# Patient Record
Sex: MALE | Race: WHITE | NOT HISPANIC OR LATINO | Employment: OTHER | URBAN - NONMETROPOLITAN AREA
[De-identification: names, ages, dates, MRNs, and addresses within clinical notes are randomized per-mention and may not be internally consistent; named-entity substitution may affect disease eponyms.]

---

## 2020-03-24 ENCOUNTER — TRANSCRIBE ORDERS (OUTPATIENT)
Dept: ADMINISTRATIVE | Facility: HOSPITAL | Age: 75
End: 2020-03-24

## 2020-03-24 DIAGNOSIS — R97.20 INCREASED PROSTATE SPECIFIC ANTIGEN (PSA) VELOCITY: Primary | ICD-10-CM

## 2020-05-04 ENCOUNTER — APPOINTMENT (OUTPATIENT)
Dept: MRI IMAGING | Facility: HOSPITAL | Age: 75
End: 2020-05-04

## 2022-12-14 ENCOUNTER — HOSPITAL ENCOUNTER (OUTPATIENT)
Facility: HOSPITAL | Age: 77
Setting detail: OBSERVATION
Discharge: HOME | End: 2022-12-15
Attending: FAMILY MEDICINE | Admitting: INTERNAL MEDICINE

## 2022-12-14 ENCOUNTER — APPOINTMENT (OUTPATIENT)
Dept: GENERAL RADIOLOGY | Facility: HOSPITAL | Age: 77
End: 2022-12-14

## 2022-12-14 DIAGNOSIS — R77.8 ELEVATED TROPONIN: ICD-10-CM

## 2022-12-14 DIAGNOSIS — I48.91 NEW ONSET ATRIAL FIBRILLATION: ICD-10-CM

## 2022-12-14 DIAGNOSIS — R07.9 CHEST PAIN, UNSPECIFIED TYPE: Primary | ICD-10-CM

## 2022-12-14 PROBLEM — I25.10 CAD (CORONARY ARTERY DISEASE): Status: ACTIVE | Noted: 2022-12-14

## 2022-12-14 PROBLEM — I73.9 PAD (PERIPHERAL ARTERY DISEASE): Status: ACTIVE | Noted: 2022-12-14

## 2022-12-14 PROBLEM — Z95.1 HX OF CABG: Status: ACTIVE | Noted: 2022-12-14

## 2022-12-14 PROBLEM — I10 ESSENTIAL HYPERTENSION: Status: ACTIVE | Noted: 2022-12-14

## 2022-12-14 PROBLEM — K21.9 GERD (GASTROESOPHAGEAL REFLUX DISEASE): Status: ACTIVE | Noted: 2022-12-14

## 2022-12-14 PROBLEM — I48.0 PAROXYSMAL ATRIAL FIBRILLATION: Status: ACTIVE | Noted: 2022-12-14

## 2022-12-14 LAB
ALBUMIN SERPL-MCNC: 3.6 G/DL (ref 3.5–5.2)
ALBUMIN/GLOB SERPL: 1.2 G/DL
ALP SERPL-CCNC: 53 U/L (ref 39–117)
ALT SERPL W P-5'-P-CCNC: 17 U/L (ref 1–41)
ANION GAP SERPL CALCULATED.3IONS-SCNC: 11 MMOL/L (ref 5–15)
ANION GAP SERPL CALCULATED.3IONS-SCNC: 8 MMOL/L (ref 5–15)
APTT PPP: 36.1 SECONDS (ref 24.1–35)
AST SERPL-CCNC: 17 U/L (ref 1–40)
BASOPHILS # BLD AUTO: 0.04 10*3/MM3 (ref 0–0.2)
BASOPHILS NFR BLD AUTO: 0.5 % (ref 0–1.5)
BILIRUB SERPL-MCNC: 0.7 MG/DL (ref 0–1.2)
BUN SERPL-MCNC: 23 MG/DL (ref 8–23)
BUN SERPL-MCNC: 27 MG/DL (ref 8–23)
BUN/CREAT SERPL: 20.4 (ref 7–25)
BUN/CREAT SERPL: 23.5 (ref 7–25)
CALCIUM SPEC-SCNC: 8.8 MG/DL (ref 8.6–10.5)
CALCIUM SPEC-SCNC: 9.3 MG/DL (ref 8.6–10.5)
CHLORIDE SERPL-SCNC: 104 MMOL/L (ref 98–107)
CHLORIDE SERPL-SCNC: 104 MMOL/L (ref 98–107)
CK SERPL-CCNC: 137 U/L (ref 20–200)
CO2 SERPL-SCNC: 24 MMOL/L (ref 22–29)
CO2 SERPL-SCNC: 28 MMOL/L (ref 22–29)
CREAT SERPL-MCNC: 1.13 MG/DL (ref 0.76–1.27)
CREAT SERPL-MCNC: 1.15 MG/DL (ref 0.76–1.27)
D DIMER PPP FEU-MCNC: 0.74 MCGFEU/ML (ref 0–0.79)
DEPRECATED RDW RBC AUTO: 47.8 FL (ref 37–54)
EGFRCR SERPLBLD CKD-EPI 2021: 64.7 ML/MIN/1.73
EGFRCR SERPLBLD CKD-EPI 2021: 66.1 ML/MIN/1.73
EOSINOPHIL # BLD AUTO: 0.14 10*3/MM3 (ref 0–0.4)
EOSINOPHIL NFR BLD AUTO: 1.8 % (ref 0.3–6.2)
ERYTHROCYTE [DISTWIDTH] IN BLOOD BY AUTOMATED COUNT: 13.1 % (ref 12.3–15.4)
GLOBULIN UR ELPH-MCNC: 3 GM/DL
GLUCOSE SERPL-MCNC: 107 MG/DL (ref 65–99)
GLUCOSE SERPL-MCNC: 98 MG/DL (ref 65–99)
HCT VFR BLD AUTO: 44.7 % (ref 37.5–51)
HGB BLD-MCNC: 14.1 G/DL (ref 13–17.7)
HOLD SPECIMEN: NORMAL
HOLD SPECIMEN: NORMAL
IMM GRANULOCYTES # BLD AUTO: 0.03 10*3/MM3 (ref 0–0.05)
IMM GRANULOCYTES NFR BLD AUTO: 0.4 % (ref 0–0.5)
INR PPP: 0.96 (ref 0.91–1.09)
LYMPHOCYTES # BLD AUTO: 1.56 10*3/MM3 (ref 0.7–3.1)
LYMPHOCYTES NFR BLD AUTO: 20 % (ref 19.6–45.3)
MAGNESIUM SERPL-MCNC: 1.8 MG/DL (ref 1.6–2.4)
MAGNESIUM SERPL-MCNC: 1.9 MG/DL (ref 1.6–2.4)
MCH RBC QN AUTO: 31.5 PG (ref 26.6–33)
MCHC RBC AUTO-ENTMCNC: 31.5 G/DL (ref 31.5–35.7)
MCV RBC AUTO: 100 FL (ref 79–97)
MONOCYTES # BLD AUTO: 0.73 10*3/MM3 (ref 0.1–0.9)
MONOCYTES NFR BLD AUTO: 9.3 % (ref 5–12)
NEUTROPHILS NFR BLD AUTO: 5.31 10*3/MM3 (ref 1.7–7)
NEUTROPHILS NFR BLD AUTO: 68 % (ref 42.7–76)
NRBC BLD AUTO-RTO: 0 /100 WBC (ref 0–0.2)
NT-PROBNP SERPL-MCNC: 219.8 PG/ML (ref 0–1800)
PLATELET # BLD AUTO: 181 10*3/MM3 (ref 140–450)
PMV BLD AUTO: 10.2 FL (ref 6–12)
POTASSIUM SERPL-SCNC: 3.9 MMOL/L (ref 3.5–5.2)
POTASSIUM SERPL-SCNC: 4.2 MMOL/L (ref 3.5–5.2)
PROT SERPL-MCNC: 6.6 G/DL (ref 6–8.5)
PROTHROMBIN TIME: 12.9 SECONDS (ref 11.8–14.8)
RBC # BLD AUTO: 4.47 10*6/MM3 (ref 4.14–5.8)
SODIUM SERPL-SCNC: 139 MMOL/L (ref 136–145)
SODIUM SERPL-SCNC: 140 MMOL/L (ref 136–145)
TROPONIN T SERPL-MCNC: 0.04 NG/ML (ref 0–0.03)
TROPONIN T SERPL-MCNC: 0.04 NG/ML (ref 0–0.03)
WBC NRBC COR # BLD: 7.81 10*3/MM3 (ref 3.4–10.8)
WHOLE BLOOD HOLD COAG: NORMAL
WHOLE BLOOD HOLD SPECIMEN: NORMAL

## 2022-12-14 PROCEDURE — G0378 HOSPITAL OBSERVATION PER HR: HCPCS

## 2022-12-14 PROCEDURE — 85730 THROMBOPLASTIN TIME PARTIAL: CPT | Performed by: FAMILY MEDICINE

## 2022-12-14 PROCEDURE — 93005 ELECTROCARDIOGRAM TRACING: CPT | Performed by: INTERNAL MEDICINE

## 2022-12-14 PROCEDURE — 84484 ASSAY OF TROPONIN QUANT: CPT | Performed by: INTERNAL MEDICINE

## 2022-12-14 PROCEDURE — 71045 X-RAY EXAM CHEST 1 VIEW: CPT

## 2022-12-14 PROCEDURE — 80053 COMPREHEN METABOLIC PANEL: CPT | Performed by: FAMILY MEDICINE

## 2022-12-14 PROCEDURE — 93010 ELECTROCARDIOGRAM REPORT: CPT | Performed by: INTERNAL MEDICINE

## 2022-12-14 PROCEDURE — 25010000002 ENOXAPARIN PER 10 MG: Performed by: INTERNAL MEDICINE

## 2022-12-14 PROCEDURE — 36415 COLL VENOUS BLD VENIPUNCTURE: CPT | Performed by: INTERNAL MEDICINE

## 2022-12-14 PROCEDURE — 83735 ASSAY OF MAGNESIUM: CPT | Performed by: FAMILY MEDICINE

## 2022-12-14 PROCEDURE — 83735 ASSAY OF MAGNESIUM: CPT | Performed by: INTERNAL MEDICINE

## 2022-12-14 PROCEDURE — 96374 THER/PROPH/DIAG INJ IV PUSH: CPT

## 2022-12-14 PROCEDURE — 84484 ASSAY OF TROPONIN QUANT: CPT | Performed by: FAMILY MEDICINE

## 2022-12-14 PROCEDURE — 93005 ELECTROCARDIOGRAM TRACING: CPT | Performed by: FAMILY MEDICINE

## 2022-12-14 PROCEDURE — 99204 OFFICE O/P NEW MOD 45 MIN: CPT | Performed by: INTERNAL MEDICINE

## 2022-12-14 PROCEDURE — 82550 ASSAY OF CK (CPK): CPT | Performed by: FAMILY MEDICINE

## 2022-12-14 PROCEDURE — 85379 FIBRIN DEGRADATION QUANT: CPT | Performed by: FAMILY MEDICINE

## 2022-12-14 PROCEDURE — 93005 ELECTROCARDIOGRAM TRACING: CPT

## 2022-12-14 PROCEDURE — 96372 THER/PROPH/DIAG INJ SC/IM: CPT

## 2022-12-14 PROCEDURE — 99285 EMERGENCY DEPT VISIT HI MDM: CPT

## 2022-12-14 PROCEDURE — 85025 COMPLETE CBC W/AUTO DIFF WBC: CPT | Performed by: FAMILY MEDICINE

## 2022-12-14 PROCEDURE — 83880 ASSAY OF NATRIURETIC PEPTIDE: CPT | Performed by: FAMILY MEDICINE

## 2022-12-14 PROCEDURE — 85610 PROTHROMBIN TIME: CPT | Performed by: FAMILY MEDICINE

## 2022-12-14 RX ORDER — SODIUM CHLORIDE 0.9 % (FLUSH) 0.9 %
10 SYRINGE (ML) INJECTION AS NEEDED
Status: DISCONTINUED | OUTPATIENT
Start: 2022-12-14 | End: 2022-12-15 | Stop reason: HOSPADM

## 2022-12-14 RX ORDER — LABETALOL HYDROCHLORIDE 5 MG/ML
10 INJECTION, SOLUTION INTRAVENOUS EVERY 6 HOURS PRN
Status: DISCONTINUED | OUTPATIENT
Start: 2022-12-14 | End: 2022-12-15 | Stop reason: HOSPADM

## 2022-12-14 RX ORDER — FAMOTIDINE 10 MG/ML
20 INJECTION, SOLUTION INTRAVENOUS EVERY 12 HOURS SCHEDULED
Status: DISCONTINUED | OUTPATIENT
Start: 2022-12-14 | End: 2022-12-15 | Stop reason: HOSPADM

## 2022-12-14 RX ORDER — TAMSULOSIN HYDROCHLORIDE 0.4 MG/1
0.4 CAPSULE ORAL DAILY
COMMUNITY
Start: 2022-06-27

## 2022-12-14 RX ORDER — MELATONIN
1000 DAILY
COMMUNITY

## 2022-12-14 RX ORDER — HYDRALAZINE HYDROCHLORIDE 20 MG/ML
20 INJECTION INTRAMUSCULAR; INTRAVENOUS ONCE
Status: DISCONTINUED | OUTPATIENT
Start: 2022-12-14 | End: 2022-12-14

## 2022-12-14 RX ORDER — CLOPIDOGREL BISULFATE 75 MG/1
75 TABLET ORAL DAILY
Status: DISCONTINUED | OUTPATIENT
Start: 2022-12-15 | End: 2022-12-15

## 2022-12-14 RX ORDER — ACETAMINOPHEN 325 MG/1
650 TABLET ORAL EVERY 6 HOURS PRN
Status: DISCONTINUED | OUTPATIENT
Start: 2022-12-14 | End: 2022-12-15 | Stop reason: HOSPADM

## 2022-12-14 RX ORDER — SODIUM CHLORIDE 0.9 % (FLUSH) 0.9 %
10 SYRINGE (ML) INJECTION EVERY 12 HOURS SCHEDULED
Status: DISCONTINUED | OUTPATIENT
Start: 2022-12-14 | End: 2022-12-15 | Stop reason: HOSPADM

## 2022-12-14 RX ORDER — BUDESONIDE AND FORMOTEROL FUMARATE DIHYDRATE 80; 4.5 UG/1; UG/1
2 AEROSOL RESPIRATORY (INHALATION)
Status: DISCONTINUED | OUTPATIENT
Start: 2022-12-14 | End: 2022-12-14

## 2022-12-14 RX ORDER — BUDESONIDE, GLYCOPYRROLATE, AND FORMOTEROL FUMARATE 160; 9; 4.8 UG/1; UG/1; UG/1
2 AEROSOL, METERED RESPIRATORY (INHALATION) 2 TIMES DAILY
COMMUNITY
Start: 2022-09-28

## 2022-12-14 RX ORDER — SODIUM CHLORIDE 9 MG/ML
40 INJECTION, SOLUTION INTRAVENOUS AS NEEDED
Status: DISCONTINUED | OUTPATIENT
Start: 2022-12-14 | End: 2022-12-15 | Stop reason: HOSPADM

## 2022-12-14 RX ORDER — ASPIRIN 81 MG/1
81 TABLET ORAL DAILY
Status: DISCONTINUED | OUTPATIENT
Start: 2022-12-15 | End: 2022-12-15 | Stop reason: HOSPADM

## 2022-12-14 RX ORDER — ONDANSETRON 2 MG/ML
4 INJECTION INTRAMUSCULAR; INTRAVENOUS EVERY 6 HOURS PRN
Status: DISCONTINUED | OUTPATIENT
Start: 2022-12-14 | End: 2022-12-15 | Stop reason: HOSPADM

## 2022-12-14 RX ORDER — ASPIRIN 81 MG/1
81 TABLET ORAL DAILY
COMMUNITY

## 2022-12-14 RX ORDER — RAMIPRIL 10 MG/1
10 CAPSULE ORAL DAILY
COMMUNITY
Start: 2022-11-14

## 2022-12-14 RX ORDER — LISINOPRIL 10 MG/1
10 TABLET ORAL
Status: DISCONTINUED | OUTPATIENT
Start: 2022-12-14 | End: 2022-12-15 | Stop reason: HOSPADM

## 2022-12-14 RX ORDER — RAMIPRIL 5 MG/1
5 CAPSULE ORAL DAILY
COMMUNITY
Start: 2022-06-27

## 2022-12-14 RX ORDER — CLOPIDOGREL BISULFATE 75 MG/1
75 TABLET ORAL DAILY
COMMUNITY
End: 2022-12-15 | Stop reason: HOSPADM

## 2022-12-14 RX ORDER — NITROGLYCERIN 0.4 MG/1
0.4 TABLET SUBLINGUAL
Status: DISCONTINUED | OUTPATIENT
Start: 2022-12-14 | End: 2022-12-15 | Stop reason: HOSPADM

## 2022-12-14 RX ORDER — TAMSULOSIN HYDROCHLORIDE 0.4 MG/1
0.4 CAPSULE ORAL DAILY
Status: DISCONTINUED | OUTPATIENT
Start: 2022-12-15 | End: 2022-12-15 | Stop reason: HOSPADM

## 2022-12-14 RX ORDER — ALBUTEROL SULFATE 2.5 MG/3ML
2.5 SOLUTION RESPIRATORY (INHALATION) EVERY 6 HOURS PRN
Status: DISCONTINUED | OUTPATIENT
Start: 2022-12-14 | End: 2022-12-15 | Stop reason: HOSPADM

## 2022-12-14 RX ORDER — BUDESONIDE AND FORMOTEROL FUMARATE DIHYDRATE 160; 4.5 UG/1; UG/1
2 AEROSOL RESPIRATORY (INHALATION)
Status: DISCONTINUED | OUTPATIENT
Start: 2022-12-14 | End: 2022-12-15 | Stop reason: HOSPADM

## 2022-12-14 RX ORDER — ENOXAPARIN SODIUM 150 MG/ML
1 INJECTION SUBCUTANEOUS EVERY 12 HOURS
Status: DISCONTINUED | OUTPATIENT
Start: 2022-12-15 | End: 2022-12-15

## 2022-12-14 RX ORDER — NITROGLYCERIN 0.4 MG/1
0.4 TABLET SUBLINGUAL
COMMUNITY
Start: 2022-06-27

## 2022-12-14 RX ORDER — ATORVASTATIN CALCIUM 20 MG/1
20 TABLET, FILM COATED ORAL DAILY
COMMUNITY
Start: 2022-06-27

## 2022-12-14 RX ORDER — DIPHENOXYLATE HYDROCHLORIDE AND ATROPINE SULFATE 2.5; .025 MG/1; MG/1
1 TABLET ORAL DAILY
COMMUNITY

## 2022-12-14 RX ORDER — LORAZEPAM 1 MG/1
TABLET ORAL
COMMUNITY
Start: 2022-09-06

## 2022-12-14 RX ORDER — ENOXAPARIN SODIUM 150 MG/ML
1 INJECTION SUBCUTANEOUS ONCE
Status: COMPLETED | OUTPATIENT
Start: 2022-12-14 | End: 2022-12-14

## 2022-12-14 RX ORDER — ATORVASTATIN CALCIUM 10 MG/1
20 TABLET, FILM COATED ORAL NIGHTLY
Status: DISCONTINUED | OUTPATIENT
Start: 2022-12-14 | End: 2022-12-15 | Stop reason: HOSPADM

## 2022-12-14 RX ADMIN — LISINOPRIL 10 MG: 10 TABLET ORAL at 17:01

## 2022-12-14 RX ADMIN — Medication 10 ML: at 17:02

## 2022-12-14 RX ADMIN — METOPROLOL TARTRATE 25 MG: 25 TABLET, FILM COATED ORAL at 17:01

## 2022-12-14 RX ADMIN — FAMOTIDINE 20 MG: 10 INJECTION INTRAVENOUS at 17:01

## 2022-12-14 RX ADMIN — ENOXAPARIN SODIUM 140 MG: 150 INJECTION SUBCUTANEOUS at 17:01

## 2022-12-14 NOTE — H&P
[facility] Medicine Services  HISTORY AND PHYSICAL    Date of Admission: 12/14/2022  Primary Care Physician: Provider, No Known    Subjective   Primary Historian: Patient    Chief Complaint: chest pain, afib    History of Present Illness  Patient is a 77-year-old male who lives in [facility] where he follows for primary care and cardiology.  Patient has a prior history of CAD with PCI and subsequent CABG back in 2014.  Patient states last night he began having some chest discomfort and went to the ER for evaluation.  He reportedly was found to be in A. fib RVR.  He also had a elevated high-sensitivity troponin.  Due to troponin elevation he was transferred here for further care.  In transfer of care he went back into sinus rhythm.  Patient tells me he had A. fib once in the past around the time he had his CABG surgery.  Denies being on chronic blood thinners.  He is on aspirin and Plavix for his underlying heart disease.  He has not had any stent placement recently and states his CABG was his last cardiac intervention.  Currently he is on room air with sats in the mid 90s and feels better.  No current chest pain.  No nausea or vomiting.  Denies any recent fevers or chills.  He does admit to some intermittent bouts of diaphoresis and chest discomfort which been ongoing more frequently recently.  He tells me these are similar to prior events he had back around his last CABG.  No other new complaints.  First troponin here was still elevated at 0.045 we been asked admit for further care.      Review of Systems   Otherwise complete ROS reviewed and negative except as mentioned in the HPI.    Past Medical History:   CAD, hypertension, PAD, CKD 2, hyperlipidemia, COPD    Past Surgical History:  CABG 2014    Social History:   Hx of previous tobacco use, Denies etoh or drugs    Family History:   Hypertension    Allergies:  Allergies   Allergen Reactions   • Latex Rash   •  Pineapple Rash       Medications:  Prior to Admission medications    Medication Sig Start Date End Date Taking? Authorizing Provider   atorvastatin (LIPITOR) 20 MG tablet Take 20 mg by mouth Daily. 6/27/22  Yes Dony Godinez MD   Budeson-Glycopyrrol-Formoterol (Breztri Aerosphere) 160-9-4.8 MCG/ACT aerosol inhaler Inhale 2 puffs 2 (Two) Times a Day. 9/28/22  Yes Dony Godinez MD   LORazepam (ATIVAN) 1 MG tablet TAKE 1 TABLET(1 MG) BY MOUTH EVERY 8 HOURS AS NEEDED FOR ANXIETY 9/6/22  Yes Dony Godinez MD   nitroglycerin (NITROSTAT) 0.4 MG SL tablet Place 0.4 mg under the tongue Every 5 (Five) Minutes As Needed. 6/27/22  Yes Dony Godinez MD   ramipril (ALTACE) 10 MG capsule Take 10 mg by mouth Daily. 11/14/22  Yes oDny Godinez MD   ramipril (ALTACE) 5 MG capsule Take 5 mg by mouth Daily. 6/27/22  Yes Dony Godinez MD   tamsulosin (FLOMAX) 0.4 MG capsule 24 hr capsule Take 0.4 mg by mouth Daily. 6/27/22  Yes Dony Godinez MD   aspirin 81 MG EC tablet Take 81 mg by mouth Daily.    Dony Godinez MD   Budeson-Glycopyrrol-Formoterol (BREZTRI) 160-9-4.8 MCG/ACT aerosol inhaler Inhale.    Dony Godinez MD   Cholecalciferol 25 MCG (1000 UT) tablet Take 1,000 Units by mouth Daily.    Dony Godinez MD   clopidogrel (PLAVIX) 75 MG tablet Take 75 mg by mouth Daily.    Dony Godinez MD   cyanocobalamin (VITAMIN B-12) 1000 MCG tablet Take 1,000 mcg by mouth Daily.    Dony Godinez MD   Magnesium 400 MG capsule Take 400 mg by mouth Daily.    Dony Godinez MD   metoprolol tartrate (LOPRESSOR) 25 MG tablet Take 25 mg by mouth.    Dony Godinez MD   multivitamin (THERAGRAN) tablet tablet Take 1 tablet by mouth Daily.    Dony Godinez MD   Potassium 99 MG tablet Take 1 tablet by mouth Daily.    Dony Godinez MD   POTASSIUM GLUCONATE PO Take 1 tablet by mouth.    Dony Godinez MD     I have utilized  "all available immediate resources to obtain, update, or review the patient's current medications (including all prescriptions, over-the-counter products, herbals, cannabis/cannabidiol products, and vitamin/mineral/dietary (nutritional) supplements).    Objective     Vital Signs: BP (!) 190/97   Pulse 73   Temp 99.2 °F (37.3 °C)   Resp 20   Ht 182.9 cm (72\")   Wt (!) 139 kg (307 lb)   SpO2 98%   BMI 41.64 kg/m²   Physical Exam   GEN: Awake, alert, interactive, in NAD  HEENT:  PERRLA, EOMI, Anicteric, Trachea midline  Lungs: no wheezing/rales/rhonchi  Heart: RRR, +S1/s2, no rub  ABD: soft, nt/nd, +BS, no guarding/rebound  Extremities: atraumatic, no cyanosis, trace LE edema  Skin: no rashes or petechiae  Neuro: AAOx3, no focal deficits  Psych: normal mood & affect        Results Reviewed:  Lab Results (last 24 hours)     Procedure Component Value Units Date/Time    Troponin [[id number]]  (Abnormal) Collected: 12/14/22 1027    Specimen: Blood Updated: 12/14/22 1152     Troponin T 0.045 ng/mL     Narrative:      Troponin T Reference Range:  <= 0.03 ng/mL-   Negative for AMI  >0.03 ng/mL-     Abnormal for myocardial necrosis.  Clinicians would have to utilize clinical acumen, EKG, Troponin and serial changes to determine if it is an Acute Myocardial Infarction or myocardial injury due to an underlying chronic condition.       Results may be falsely decreased if patient taking Biotin.      [place] [facility] [[id number]] Collected: 12/14/22 1027    Specimen: Blood Updated: 12/14/22 1130    Narrative:      The following orders were created for panel order [place] [facility].  Procedure                               Abnormality         Status                     ---------                               -----------         ------                     Green Top (Gel)[[id number]]                                  Final result               Lavender Top[[id number]]                                     Final result               Red " Top[[id number]]                                          Final result               Light Blue Top[[id number]]                                   Final result                 Please view results for these tests on the individual orders.    Green Top (Gel) [[id number]] Collected: 12/14/22 1027    Specimen: Blood Updated: 12/14/22 1130     Extra Tube Hold for add-ons.     Comment: Auto resulted.       Red Top [[id number]] Collected: 12/14/22 1027    Specimen: Blood Updated: 12/14/22 1130     Extra Tube Hold for add-ons.     Comment: Auto resulted.       Light Blue Top [[id number]] Collected: 12/14/22 1027    Specimen: Blood Updated: 12/14/22 1130     Extra Tube Hold for add-ons.     Comment: Auto resulted       Lavender Top [[id number]] Collected: 12/14/22 1027    Specimen: Blood Updated: 12/14/22 1130     Extra Tube hold for add-on     Comment: Auto resulted       Comprehensive Metabolic Panel [[id number]]  (Abnormal) Collected: 12/14/22 1027    Specimen: Blood Updated: 12/14/22 1107     Glucose 107 mg/dL      BUN 27 mg/dL      Creatinine 1.15 mg/dL      Sodium 140 mmol/L      Potassium 4.2 mmol/L      Comment: Slight hemolysis detected by analyzer. Results may be affected.        Chloride 104 mmol/L      CO2 28.0 mmol/L      Calcium 9.3 mg/dL      Total Protein 6.6 g/dL      Albumin 3.60 g/dL      ALT (SGPT) 17 U/L      AST (SGOT) 17 U/L      Comment: Slight hemolysis detected by analyzer. Results may be affected.        Alkaline Phosphatase 53 U/L      Total Bilirubin 0.7 mg/dL      Globulin 3.0 gm/dL      A/G Ratio 1.2 g/dL      BUN/Creatinine Ratio 23.5     Anion Gap 8.0 mmol/L      eGFR 64.7 mL/min/1.73      Comment: National Kidney Foundation and American Society of Nephrology (ASN) Task Force recommended calculation based on the Chronic Kidney Disease Epidemiology Collaboration (CKD-EPI) equation refit without adjustment for race.       Narrative:      GFR Normal >60  Chronic Kidney Disease <60  Kidney Failure  "<15    The GFR formula is only valid for adults with stable renal function between ages 18 and 70.    CK [[id number]]  (Normal) Collected: 12/14/22 1027    Specimen: Blood Updated: 12/14/22 1106     Creatine Kinase 137 U/L     BNP [[id number]]  (Normal) Collected: 12/14/22 1027    Specimen: Blood Updated: 12/14/22 1104     proBNP 219.8 pg/mL     Narrative:      Among patients with dyspnea, NT-proBNP is highly sensitive for the detection of acute congestive heart failure. In addition NT-proBNP of <300 pg/ml effectively rules out acute congestive heart failure with 99% negative predictive value.      Magnesium [[id number]]  (Normal) Collected: 12/14/22 1027    Specimen: Blood Updated: 12/14/22 1101     Magnesium 1.9 mg/dL     aPTT [[id number]]  (Abnormal) Collected: 12/14/22 1027    Specimen: Blood Updated: 12/14/22 1100     PTT 36.1 seconds     D-dimer, Quantitative [[id number]]  (Normal) Collected: 12/14/22 1027    Specimen: Blood Updated: 12/14/22 1100     D-Dimer, Quantitative 0.74 MCGFEU/mL     Narrative:      According to the assay 's published package insert, a normal (<0.50 MCGFEU/mL) D-dimer result in conjunction with a non-high clinical probability assessment, excludes deep vein thrombosis (DVT) and pulmonary embolism (PE) with high sensitivity.    D-dimer values increase with age and this can make VTE exclusion of an older population difficult. To address this, the American College of Physicians, based on best available evidence and recent guidelines, recommends that clinicians use age-adjusted D-dimer thresholds in patients greater than 50 years of age with: a) a low probability of PE who do not meet all Pulmonary Embolism Rule Out Criteria, or b) in those with intermediate probability of PE.   The formula for an age-adjusted D-dimer cut-off is \"age/100\".  For example, a 60 year old patient would have an age-adjusted cut-off of 0.60 MCGFEU/mL and an 80 year old 0.80 MCGFEU/mL.    Protime-INR " [[id number]]  (Normal) Collected: 12/14/22 1027    Specimen: Blood Updated: 12/14/22 1100     Protime 12.9 Seconds      INR 0.96    CBC & Differential [[id number]]  (Abnormal) Collected: 12/14/22 1027    Specimen: Blood Updated: 12/14/22 1050    Narrative:      The following orders were created for panel order CBC & Differential.  Procedure                               Abnormality         Status                     ---------                               -----------         ------                     CBC Auto Differential[[id number]]        Abnormal            Final result                 Please view results for these tests on the individual orders.    CBC Auto Differential [[id number]]  (Abnormal) Collected: 12/14/22 1027    Specimen: Blood Updated: 12/14/22 1050     WBC 7.81 10*3/mm3      RBC 4.47 10*6/mm3      Hemoglobin 14.1 g/dL      Hematocrit 44.7 %      .0 fL      MCH 31.5 pg      MCHC 31.5 g/dL      RDW 13.1 %      RDW-SD 47.8 fl      MPV 10.2 fL      Platelets 181 10*3/mm3      Neutrophil % 68.0 %      Lymphocyte % 20.0 %      Monocyte % 9.3 %      Eosinophil % 1.8 %      Basophil % 0.5 %      Immature Grans % 0.4 %      Neutrophils, Absolute 5.31 10*3/mm3      Lymphocytes, Absolute 1.56 10*3/mm3      Monocytes, Absolute 0.73 10*3/mm3      Eosinophils, Absolute 0.14 10*3/mm3      Basophils, Absolute 0.04 10*3/mm3      Immature Grans, Absolute 0.03 10*3/mm3      nRBC 0.0 /100 WBC         Imaging Results (Last 24 Hours)     Procedure Component Value Units Date/Time    XR Chest 1 View [[id number]] Collected: 12/14/22 1044     Updated: 12/14/22 1048    Narrative:      EXAMINATION: XR CHEST 1 VW-     12/14/2022 10:31 AM CST     HISTORY: Chest pain     A frontal projection of the chest is obtained. There is no previous  study for comparison.     The lungs are poorly expanded.     There are coarse interstitial changes in the lower lungs bilaterally  representing scarring and atelectasis.     Few granulomas are  seen in the lungs bilaterally.     No evidence of pleural effusion, pulmonary congestion or pneumothorax.     The heart size is in the normal range. There is evidence of previous  cardiac surgery.     No acute bony abnormality.       Impression:      1. No active cardiopulmonary disease.        This report was finalized on 12/14/2022 10:45 by Dr. Leo Villar MD.        I have personally reviewed and interpreted the radiology studies and ECG obtained at time of admission.     Assessment / Plan     Assessment:   Active Hospital Problems    Diagnosis    • **Chest pain, unspecified type    • Paroxysmal atrial fibrillation (HCC)    • CAD (coronary artery disease)    • Hx of CABG    • Essential hypertension    • PAD (peripheral artery disease) (HCC)    • GERD (gastroesophageal reflux disease)      Plan:   The patient will be admitted to my service here at [facility].     #1 chest pain -likely related to the patient's atrial fibrillation but he has significant underlying cardiac history and states some of his symptoms are similar to when he had required prior CABG surgery.  He is essentially already felt his own stress test as he has had a positive troponin in the setting of A. fib.  Cannot stress test under these conditions.  We will repeat another troponin to look for stability and ask cardiology to evaluate for next step in plans.  For now we will keep on full dose Lovenox for both potential ACS and A. fib.  May need to drop either the aspirin or Plavix, will follow up with cardiology recommendations.  Continue beta-blocker and statin.    #2 paroxysmal A. Fib -back in sinus rhythm.  Resume home beta-blocker.  Continue Lovenox for now and monitor    #3 GERD -we will cover with famotidine for now    #4 hypertension -pressure elevated on arrival.  Resuming home medications.  Monitor.  IV as needed available.    #5 COPD -not wheezing.  We do not have his home inhaler available.  Will order low-dose  Symbicort.  As needed albuterol.    I confirmed that the patient's advanced care plan is present, code status is documented, and a surrogate decision maker is listed in the patient's medical record.     Code Status/Advanced Care Plan: DNR otherwise full interventions.    The patient's surrogate decision maker is his son and daughter.  Kat and Amado     Patient discussed with Dr. Palm of cardiology    Estimated length of stay is less than 2 days.     The patient was seen and examined by me on 12/14/2022 at 12:35 PM.    Electronically signed by Gunner Leach DO, 12/14/22, 16:56 CST.

## 2022-12-14 NOTE — ED PROVIDER NOTES
Subjective   History of Present Illness  Patient presents emergency room today from an outside hospital.  Patient was found to be in a new onset of atrial fibrillation with RVR.  Patient was placed on heparin drip and sent here.  Patient currently is not experiencing any chest pain.  Patient states that last night he was having some substernal chest pain.  Patient describes it as pain.  Patient states he has no radiation of the pain.  Patient has no shortness of breath.  Patient states that he does have a history of CABG. patient states that he does take aspirin and Plavix.  Patient states that some days he forgets to take his aspirin and Plavix.  Patient states that yesterday he did take his aspirin and Plavix.  Patient's first troponin at the outside hospital was normal.  Patient second troponin was elevated at 267.5 from the outside hospital.  The outside hospital has a high-sensitivity troponin.        Review of Systems   All other systems reviewed and are negative.      History reviewed. No pertinent past medical history.    Allergies   Allergen Reactions   • Latex Rash   • Pineapple Rash       History reviewed. No pertinent surgical history.    History reviewed. No pertinent family history.    Social History     Socioeconomic History   • Marital status: Single           Objective   Physical Exam  Vitals and nursing note reviewed.   Constitutional:       General: He is not in acute distress.     Appearance: Normal appearance. He is obese. He is not toxic-appearing or diaphoretic.   HENT:      Head: Normocephalic and atraumatic.      Mouth/Throat:      Mouth: Mucous membranes are moist.   Cardiovascular:      Rate and Rhythm: Normal rate and regular rhythm.      Heart sounds: Normal heart sounds.   Pulmonary:      Effort: Pulmonary effort is normal.      Breath sounds: Normal breath sounds.   Chest:      Chest wall: No tenderness.   Abdominal:      General: Bowel sounds are normal.      Palpations: Abdomen is  soft.      Tenderness: There is no abdominal tenderness.   Musculoskeletal:      Cervical back: Normal range of motion and neck supple. No tenderness.      Right lower leg: No edema.      Left lower leg: No edema.   Skin:     General: Skin is warm and dry.   Neurological:      General: No focal deficit present.      Mental Status: He is alert and oriented to person, place, and time.   Psychiatric:         Mood and Affect: Mood normal.         Behavior: Behavior normal.         Procedures           ED Course  ED Course as of 12/14/22 1209   Wed Dec 14, 2022   1034 EKG rate 73  normal sinus rhythm  No ST changes [RP]      ED Course User Index  [RP] John Gay MD                                           Lab Results (last 24 hours)     Procedure Component Value Units Date/Time    Troponin [[id number]]  (Abnormal) Collected: 12/14/22 1027    Specimen: Blood Updated: 12/14/22 1152     Troponin T 0.045 ng/mL     Narrative:      Troponin T Reference Range:  <= 0.03 ng/mL-   Negative for AMI  >0.03 ng/mL-     Abnormal for myocardial necrosis.  Clinicians would have to utilize clinical acumen, EKG, Troponin and serial changes to determine if it is an Acute Myocardial Infarction or myocardial injury due to an underlying chronic condition.       Results may be falsely decreased if patient taking Biotin.      aPTT [[id number]]  (Abnormal) Collected: 12/14/22 1027    Specimen: Blood Updated: 12/14/22 1100     PTT 36.1 seconds     BNP [[id number]]  (Normal) Collected: 12/14/22 1027    Specimen: Blood Updated: 12/14/22 1104     proBNP 219.8 pg/mL     Narrative:      Among patients with dyspnea, NT-proBNP is highly sensitive for the detection of acute congestive heart failure. In addition NT-proBNP of <300 pg/ml effectively rules out acute congestive heart failure with 99% negative predictive value.      CBC & Differential [[id number]]  (Abnormal) Collected: 12/14/22 1027    Specimen: Blood Updated: 12/14/22 1050    Narrative:       The following orders were created for panel order CBC & Differential.  Procedure                               Abnormality         Status                     ---------                               -----------         ------                     CBC Auto Differential[[id number]]        Abnormal            Final result                 Please view results for these tests on the individual orders.    CK [[id number]]  (Normal) Collected: 12/14/22 1027    Specimen: Blood Updated: 12/14/22 1106     Creatine Kinase 137 U/L     Comprehensive Metabolic Panel [[id number]]  (Abnormal) Collected: 12/14/22 1027    Specimen: Blood Updated: 12/14/22 1107     Glucose 107 mg/dL      BUN 27 mg/dL      Creatinine 1.15 mg/dL      Sodium 140 mmol/L      Potassium 4.2 mmol/L      Comment: Slight hemolysis detected by analyzer. Results may be affected.        Chloride 104 mmol/L      CO2 28.0 mmol/L      Calcium 9.3 mg/dL      Total Protein 6.6 g/dL      Albumin 3.60 g/dL      ALT (SGPT) 17 U/L      AST (SGOT) 17 U/L      Comment: Slight hemolysis detected by analyzer. Results may be affected.        Alkaline Phosphatase 53 U/L      Total Bilirubin 0.7 mg/dL      Globulin 3.0 gm/dL      A/G Ratio 1.2 g/dL      BUN/Creatinine Ratio 23.5     Anion Gap 8.0 mmol/L      eGFR 64.7 mL/min/1.73      Comment: National Kidney Foundation and American Society of Nephrology (ASN) Task Force recommended calculation based on the Chronic Kidney Disease Epidemiology Collaboration (CKD-EPI) equation refit without adjustment for race.       Narrative:      GFR Normal >60  Chronic Kidney Disease <60  Kidney Failure <15    The GFR formula is only valid for adults with stable renal function between ages 18 and 70.    D-dimer, Quantitative [[id number]]  (Normal) Collected: 12/14/22 1027    Specimen: Blood Updated: 12/14/22 1100     D-Dimer, Quantitative 0.74 MCGFEU/mL     Narrative:      According to the assay 's published package insert, a normal (<0.50  "MCGFEU/mL) D-dimer result in conjunction with a non-high clinical probability assessment, excludes deep vein thrombosis (DVT) and pulmonary embolism (PE) with high sensitivity.    D-dimer values increase with age and this can make VTE exclusion of an older population difficult. To address this, the American College of Physicians, based on best available evidence and recent guidelines, recommends that clinicians use age-adjusted D-dimer thresholds in patients greater than 50 years of age with: a) a low probability of PE who do not meet all Pulmonary Embolism Rule Out Criteria, or b) in those with intermediate probability of PE.   The formula for an age-adjusted D-dimer cut-off is \"age/100\".  For example, a 60 year old patient would have an age-adjusted cut-off of 0.60 MCGFEU/mL and an 80 year old 0.80 MCGFEU/mL.    Magnesium [[id number]]  (Normal) Collected: 12/14/22 1027    Specimen: Blood Updated: 12/14/22 1101     Magnesium 1.9 mg/dL     Protime-INR [[id number]]  (Normal) Collected: 12/14/22 1027    Specimen: Blood Updated: 12/14/22 1100     Protime 12.9 Seconds      INR 0.96    CBC Auto Differential [[id number]]  (Abnormal) Collected: 12/14/22 1027    Specimen: Blood Updated: 12/14/22 1050     WBC 7.81 10*3/mm3      RBC 4.47 10*6/mm3      Hemoglobin 14.1 g/dL      Hematocrit 44.7 %      .0 fL      MCH 31.5 pg      MCHC 31.5 g/dL      RDW 13.1 %      RDW-SD 47.8 fl      MPV 10.2 fL      Platelets 181 10*3/mm3      Neutrophil % 68.0 %      Lymphocyte % 20.0 %      Monocyte % 9.3 %      Eosinophil % 1.8 %      Basophil % 0.5 %      Immature Grans % 0.4 %      Neutrophils, Absolute 5.31 10*3/mm3      Lymphocytes, Absolute 1.56 10*3/mm3      Monocytes, Absolute 0.73 10*3/mm3      Eosinophils, Absolute 0.14 10*3/mm3      Basophils, Absolute 0.04 10*3/mm3      Immature Grans, Absolute 0.03 10*3/mm3      nRBC 0.0 /100 WBC             XR Chest 1 View   Final Result   1. No active cardiopulmonary disease.         "   This report was finalized on 12/14/2022 10:45 by Dr. Leo Villar MD.            MDM    Patient does have a heart score of 6.     Patient has a [id number]  score of 4    I spoke with the hospitalist Dr. Zaragoza who has accepted the patient under the services of Dr. Castellon.         Final diagnoses:   Chest pain, unspecified type   New onset atrial fibrillation (HCC)   Elevated troponin       ED Disposition  ED Disposition     ED Disposition   Decision to Admit    Condition   --    Comment   Level of Care: Telemetry [5]   Diagnosis: Chest pain, unspecified type [[id number]]   Admitting Physician: TERESA CASTELLON [[id number]]   Attending Physician: TERESA CASTELLON [[id number]]               No follow-up provider specified.       Medication List      No changes were made to your prescriptions during this visit.          John Gay MD  12/14/22 0274

## 2022-12-15 VITALS
RESPIRATION RATE: 16 BRPM | WEIGHT: 307 LBS | TEMPERATURE: 98.3 F | DIASTOLIC BLOOD PRESSURE: 57 MMHG | HEART RATE: 66 BPM | HEIGHT: 72 IN | OXYGEN SATURATION: 93 % | BODY MASS INDEX: 41.58 KG/M2 | SYSTOLIC BLOOD PRESSURE: 146 MMHG

## 2022-12-15 LAB
QT INTERVAL: 422 MS
QT INTERVAL: 428 MS
QT INTERVAL: 436 MS
QTC INTERVAL: 448 MS
QTC INTERVAL: 457 MS
QTC INTERVAL: 464 MS

## 2022-12-15 PROCEDURE — G0378 HOSPITAL OBSERVATION PER HR: HCPCS

## 2022-12-15 PROCEDURE — 94799 UNLISTED PULMONARY SVC/PX: CPT

## 2022-12-15 PROCEDURE — 99214 OFFICE O/P EST MOD 30 MIN: CPT | Performed by: INTERNAL MEDICINE

## 2022-12-15 PROCEDURE — 93010 ELECTROCARDIOGRAM REPORT: CPT | Performed by: INTERNAL MEDICINE

## 2022-12-15 PROCEDURE — 94640 AIRWAY INHALATION TREATMENT: CPT

## 2022-12-15 PROCEDURE — 93005 ELECTROCARDIOGRAM TRACING: CPT | Performed by: INTERNAL MEDICINE

## 2022-12-15 RX ORDER — LORAZEPAM 1 MG/1
1 TABLET ORAL EVERY 8 HOURS PRN
Status: DISCONTINUED | OUTPATIENT
Start: 2022-12-15 | End: 2022-12-15 | Stop reason: HOSPADM

## 2022-12-15 RX ADMIN — LORAZEPAM 1 MG: 1 TABLET ORAL at 09:05

## 2022-12-15 RX ADMIN — BUDESONIDE AND FORMOTEROL FUMARATE DIHYDRATE 2 PUFF: 160; 4.5 AEROSOL RESPIRATORY (INHALATION) at 07:20

## 2022-12-15 RX ADMIN — METOPROLOL TARTRATE 25 MG: 25 TABLET, FILM COATED ORAL at 09:06

## 2022-12-15 RX ADMIN — ASPIRIN 81 MG: 81 TABLET, COATED ORAL at 09:06

## 2022-12-15 RX ADMIN — APIXABAN 5 MG: 5 TABLET, FILM COATED ORAL at 09:06

## 2022-12-15 RX ADMIN — LISINOPRIL 10 MG: 10 TABLET ORAL at 09:06

## 2022-12-15 RX ADMIN — ATORVASTATIN CALCIUM 20 MG: 10 TABLET, FILM COATED ORAL at 01:38

## 2022-12-15 RX ADMIN — TAMSULOSIN HYDROCHLORIDE 0.4 MG: 0.4 CAPSULE ORAL at 09:06

## 2022-12-15 NOTE — PLAN OF CARE
Goal Outcome Evaluation:  Plan of Care Reviewed With: patient        Progress: no change  Outcome Evaluation: VSS. Pt intially A/O. Pt very forgetful and intermittently confused. Frequent reorientation/redirection needed. SS case management consult put in because the pt told me he lives at home alone and there are concerns for pt safety at discharge if pt does live alone. Pt has unsteady gait as well. Bed alarm set. No c/o pain. Pt safety was maintained.

## 2022-12-15 NOTE — DISCHARGE SUMMARY
[facility] Medicine Services  DISCHARGE SUMMARY       Date of Admission: 12/14/2022  Date of Discharge:  12/15/2022  Primary Care Physician: Provider, No Known    Presenting Problem/Chief Complaint:  Afib, elevated troponin    Final Discharge Diagnoses:  Active Hospital Problems    Diagnosis    • **Chest pain, unspecified type    • Paroxysmal atrial fibrillation (HCC)    • CAD (coronary artery disease)    • Hx of CABG    • Essential hypertension    • PAD (peripheral artery disease) (HCC)    • GERD (gastroesophageal reflux disease)        Consults:   #1 Dr. Palm, cardiology    Procedures Performed: none    Pertinent Test Results:   Imaging Results (All)     Procedure Component Value Units Date/Time    XR Chest 1 View [[id number]] Collected: 12/14/22 1044     Updated: 12/14/22 1048    Narrative:      EXAMINATION: XR CHEST 1 VW-     12/14/2022 10:31 AM CST     HISTORY: Chest pain     A frontal projection of the chest is obtained. There is no previous  study for comparison.     The lungs are poorly expanded.     There are coarse interstitial changes in the lower lungs bilaterally  representing scarring and atelectasis.     Few granulomas are seen in the lungs bilaterally.     No evidence of pleural effusion, pulmonary congestion or pneumothorax.     The heart size is in the normal range. There is evidence of previous  cardiac surgery.     No acute bony abnormality.       Impression:      1. No active cardiopulmonary disease.        This report was finalized on 12/14/2022 10:45 by Dr. Leo Villar MD.          LAB RESULTS:      Lab 12/14/22  1027   WBC 7.81   HEMOGLOBIN 14.1   HEMATOCRIT 44.7   PLATELETS 181   NEUTROS ABS 5.31   IMMATURE GRANS (ABS) 0.03   LYMPHS ABS 1.56   MONOS ABS 0.73   EOS ABS 0.14   .0*   PROTIME 12.9   APTT 36.1*   D DIMER QUANT 0.74         Lab 12/14/22  1650 12/14/22  1027   SODIUM 139 140   POTASSIUM 3.9 4.2   CHLORIDE 104 104   CO2 24.0 28.0    ANION GAP 11.0 8.0   BUN 23 27*   CREATININE 1.13 1.15   EGFR 66.1 64.7   GLUCOSE 98 107*   CALCIUM 8.8 9.3   MAGNESIUM 1.8 1.9         Lab 12/14/22  1027   TOTAL PROTEIN 6.6   ALBUMIN 3.60   GLOBULIN 3.0   ALT (SGPT) 17   AST (SGOT) 17   BILIRUBIN 0.7   ALK PHOS 53         Lab 12/14/22  1316 12/14/22  1027   PROBNP  --  219.8   TROPONIN T 0.041* 0.045*   PROTIME  --  12.9   INR  --  0.96                 Brief Urine Lab Results     None        Microbiology Results (last 10 days)     ** No results found for the last 240 hours. **          Chief Complaint on Day of Discharge:   F/u elevated troponin    History of Present Illness on Day of Discharge:   Patient feels well.  No chest pain.  No dizziness.  No shortness of breath.  Has been in sinus rhythm since arrival to our facility.  No adverse events overnight.    Hospital Course:  The patient is a 77 y.o. male with chronic significant medical history to include CAD with prior PCI and CABG.  His CABG in 2014.  He had A. fib once back around that time.  He presented to outside hospital yesterday 12/14 due to chest discomfort that he was having/palpitations.  In the ER there he was found to be in A. fib RVR with a positive high-sensitivity troponin.  He was transferred here due to that high-sensitivity troponin.  Prior to arrival here had converted back to sinus rhythm.  His chest comfort resolved.  Troponin here initially mildly elevated at 0.045 and flat on repeat at 0.041.  EKG without any acute changes.  Pulm was likely elevated in the setting of his A. fib.  He also has mild chronic kidney disease, CKD 2.  Patient tells me that his cardiologist and Elaina that he sees from [facility] has told him prior that he has 1 small vessel left feeding his heart and that her thing else is chronic chronically occluded.  He had told him prior therapy no further interventions or things that can be done for him and that his neck heart would likely be his death.  Patient  "without signs of heart attack at this time.  Given events he wants no further cardiac work-up at this time.  Plans to follow-up with his cardiologist after discharge.  Can likely plan for medical management going forward.  This would defer to his cardiologist who knows him and his anatomy better.  I instructed the patient to seek medical evaluation if any return or worsening of symptoms/condition.    Condition on Discharge:  Stable on room air, in NSR    Physical Exam on Discharge:  /57 (BP Location: Right arm, Patient Position: Lying)   Pulse 66   Temp 98.3 °F (36.8 °C) (Oral)   Resp 16   Ht 182.9 cm (72\")   Wt (!) 139 kg (307 lb)   SpO2 93%   BMI 41.64 kg/m²   Physical Exam  GEN: Awake, alert, interactive, in NAD  HEENT:  PERRLA, EOMI, Anicteric, Trachea midline  Lungs: no wheezing/rales/rhonchi  Heart: RRR, +S1/s2, no rub  ABD: soft, nt/nd, +BS, no guarding/rebound  Extremities: atraumatic, no cyanosis, trace LE edema  Skin: no rashes or petechiae  Neuro: AAOx3, no focal deficits  Psych: normal mood & affect    Discharge Disposition:  Home or Self Care    Discharge Medications:     Discharge Medications      New Medications      Instructions Start Date   apixaban 5 MG tablet tablet  Commonly known as: ELIQUIS   5 mg, Oral, Every 12 Hours Scheduled         Continue These Medications      Instructions Start Date   aspirin 81 MG EC tablet   81 mg, Oral, Daily      atorvastatin 20 MG tablet  Commonly known as: LIPITOR   20 mg, Oral, Daily      Budeson-Glycopyrrol-Formoterol 160-9-4.8 MCG/ACT aerosol inhaler  Commonly known as: BREZTRI   Inhalation      Breztri Aerosphere 160-9-4.8 MCG/ACT aerosol inhaler  Generic drug: Budeson-Glycopyrrol-Formoterol   2 puffs, Inhalation, 2 Times Daily      cholecalciferol 25 MCG (1000 UT) tablet  Commonly known as: VITAMIN D3   1,000 Units, Oral, Daily      cyanocobalamin 1000 MCG tablet  Commonly known as: VITAMIN B-12   1,000 mcg, Oral, Daily      LORazepam 1 MG " tablet  Commonly known as: ATIVAN   TAKE 1 TABLET(1 MG) BY MOUTH EVERY 8 HOURS AS NEEDED FOR ANXIETY      Magnesium 400 MG capsule   400 mg, Oral, Daily      metoprolol tartrate 25 MG tablet  Commonly known as: LOPRESSOR   25 mg, Oral      multivitamin tablet tablet   1 tablet, Oral, Daily      nitroglycerin 0.4 MG SL tablet  Commonly known as: NITROSTAT   0.4 mg, Sublingual, Every 5 Minutes PRN      Potassium 99 MG tablet   1 tablet, Oral, Daily      POTASSIUM GLUCONATE PO   1 tablet, Oral      ramipril 5 MG capsule  Commonly known as: ALTACE   5 mg, Oral, Daily      ramipril 10 MG capsule  Commonly known as: ALTACE   10 mg, Oral, Daily      tamsulosin 0.4 MG capsule 24 hr capsule  Commonly known as: FLOMAX   0.4 mg, Oral, Daily         Stop These Medications    clopidogrel 75 MG tablet  Commonly known as: PLAVIX            Discharge Diet:    As prior    Activity at Discharge:    As prior    Discharge Care Plan/Instructions:   Plavix discontinued.  Take Eliquis as instructed.  Take your previous home medicines as prior.  Follow-up with your PCP and cardiologist for ongoing care and plans.  Seek Nikel evaluation immediately for any return or worsening of symptoms.    Follow-up Appointments:   No future appointments.    Test Results Pending at Discharge: None    Electronically signed by Gunner Leach DO, 12/15/22, 08:56 CST.    Time: 25 minutes

## 2022-12-15 NOTE — PROGRESS NOTES
LOS: 0 days   Patient Care Team:  Provider, No Known as PCP - General    Chief Complaint: Shortness of breath     Subjective    Lefelynd Robert Rosas is a 77 y.o. male who is being seen  Overnight per nursing note has had some confusion  However when I visited him he is fully alert and awake sitting up in bed and is oriented x3  Denies any chest pain  No palpitation  No presyncope  No syncope  Orthopnea  No paroxysmal nocturnal dyspnea  Wants to be discharged home  Says his bills are piling up and he does not want any additional tests done  His troponin trend is essentially flat at 0.045 and repeat at 6.041  Hemodynamically stable  No falls  No bleeding issues  Mentions daughter coming in he wants to be discharged home    Telemetry: no malignant arrhythmia. No significant pauses.    Review of Systems   Constitutional: No chills   Has fatigue   No fever.   HENT: Negative.    Eyes: Negative.    Respiratory: Negative for cough,   No chest wall soreness,   Shortness of breath,   no wheezing, no stridor.    Cardiovascular: As above  Gastrointestinal: Negative for abdominal distention,  No abdominal pain,   No blood in stool,   No constipation,   No diarrhea,   No nausea   No vomiting.   Endocrine: Negative.    Genitourinary: Negative for difficulty urinating, dysuria, flank pain and hematuria.   Musculoskeletal: Negative.    Skin: Negative for rash and wound.   Allergic/Immunologic: Negative.    Neurological: Negative for dizziness, syncope, weakness,   No light-headedness  No  headaches.   Hematological: Does not bruise/bleed easily.   Psychiatric/Behavioral: Negative for agitation or behavioral problems,   No confusion,   the patient is  nervous/anxious.       History:   History reviewed. No pertinent past medical history.  History reviewed. No pertinent surgical history.  Social History     Socioeconomic History   • Marital status: Single     History reviewed. No pertinent family history.    Labs:  WBC WBC   Date  Value Ref Range Status   12/14/2022 7.81 3.40 - 10.80 10*3/mm3 Final      HGB Hemoglobin   Date Value Ref Range Status   12/14/2022 14.1 13.0 - 17.7 g/dL Final      HCT Hematocrit   Date Value Ref Range Status   12/14/2022 44.7 37.5 - 51.0 % Final      Platelets Platelets   Date Value Ref Range Status   12/14/2022 181 140 - 450 10*3/mm3 Final      MCV MCV   Date Value Ref Range Status   12/14/2022 100.0 (H) 79.0 - 97.0 fL Final        Results from last 7 days   Lab Units 12/14/22  1650 12/14/22  1027   SODIUM mmol/L 139 140   POTASSIUM mmol/L 3.9 4.2   CHLORIDE mmol/L 104 104   CO2 mmol/L 24.0 28.0   BUN mg/dL 23 27*   CREATININE mg/dL 1.13 1.15   CALCIUM mg/dL 8.8 9.3   BILIRUBIN mg/dL  --  0.7   ALK PHOS U/L  --  53   ALT (SGPT) U/L  --  17   AST (SGOT) U/L  --  17   GLUCOSE mg/dL 98 107*     Lab Results   Component Value Date    CKTOTAL 137 12/14/2022    TROPONINT 0.041 (C) 12/14/2022     PT/INR:    Protime   Date Value Ref Range Status   12/14/2022 12.9 11.8 - 14.8 Seconds Final   /  INR   Date Value Ref Range Status   12/14/2022 0.96 0.91 - 1.09 Final       Imaging Results (Last 72 Hours)     Procedure Component Value Units Date/Time    XR Chest 1 View [[id number]] Collected: 12/14/22 1044     Updated: 12/14/22 1048    Narrative:      EXAMINATION: XR CHEST 1 VW-     12/14/2022 10:31 AM CST     HISTORY: Chest pain     A frontal projection of the chest is obtained. There is no previous  study for comparison.     The lungs are poorly expanded.     There are coarse interstitial changes in the lower lungs bilaterally  representing scarring and atelectasis.     Few granulomas are seen in the lungs bilaterally.     No evidence of pleural effusion, pulmonary congestion or pneumothorax.     The heart size is in the normal range. There is evidence of previous  cardiac surgery.     No acute bony abnormality.       Impression:      1. No active cardiopulmonary disease.        This report was finalized on 12/14/2022 10:45 by  Dr. Leo Villar MD.          Objective     Allergies   Allergen Reactions   • Latex Rash   • Pineapple Rash       Medication Review: Performed  Current Facility-Administered Medications   Medication Dose Route Frequency Provider Last Rate Last Admin   • acetaminophen (TYLENOL) tablet 650 mg  650 mg Oral Q6H PRN Gunner Leach DO       • albuterol (PROVENTIL) nebulizer solution 0.083% 2.5 mg/3mL  2.5 mg Nebulization Q6H PRN Gunner Leach DO       • aspirin EC tablet 81 mg  81 mg Oral Daily Gunner Leach DO       • atorvastatin (LIPITOR) tablet 20 mg  20 mg Oral Nightly Gunner Leach DO   20 mg at 12/15/22 0138   • budesonide-formoterol (SYMBICORT) 160-4.5 MCG/ACT inhaler 2 puff  2 puff Inhalation BID - RT Gunner Leach DO       • clopidogrel (PLAVIX) tablet 75 mg  75 mg Oral Daily Gunner Leach DO       • Enoxaparin Sodium (LOVENOX) syringe 140 mg  1 mg/kg Subcutaneous Q12H Gunner Leach DO       • famotidine (PEPCID) injection 20 mg  20 mg Intravenous Q12H Gunner Leach DO   20 mg at 12/14/22 1701   • labetalol (NORMODYNE,TRANDATE) injection 10 mg  10 mg Intravenous Q6H PRN Gunner Leach DO       • lisinopril (PRINIVIL,ZESTRIL) tablet 10 mg  10 mg Oral Q24H Gunner Leach DO   10 mg at 12/14/22 1701   • metoprolol tartrate (LOPRESSOR) tablet 25 mg  25 mg Oral Q12H Gunner Leach DO   25 mg at 12/14/22 1701   • nitroglycerin (NITROSTAT) SL tablet 0.4 mg  0.4 mg Sublingual Q5 Min PRN Gunner Leach DO       • ondansetron (ZOFRAN) injection 4 mg  4 mg Intravenous Q6H PRN Gunner Leach DO       • sodium chloride 0.9 % flush 10 mL  10 mL Intravenous PRN Xiomara Craig MD       • sodium chloride 0.9 % flush 10 mL  10 mL Intravenous Q12H Gunner Leach DO   10 mL at 12/14/22 1702   • sodium chloride 0.9 % flush 10 mL  10 mL Intravenous PRN Gunner Leach DO       • sodium chloride 0.9 % flush 10 mL  10 mL Intravenous Q12H Gunner Leach DO   10 mL at 12/14/22  "1702   • sodium chloride 0.9 % flush 10 mL  10 mL Intravenous PRN Gunner Leach DO       • sodium chloride 0.9 % infusion 40 mL  40 mL Intravenous PRN Gunner Leach DO       • sodium chloride 0.9 % infusion 40 mL  40 mL Intravenous PRN Gunner Leach DO       • tamsulosin (FLOMAX) 24 hr capsule 0.4 mg  0.4 mg Oral Daily Gunner Leach,            Vital Sign Min/Max for last 24 hours  Temp  Min: 97.7 °F (36.5 °C)  Max: 99.2 °F (37.3 °C)   BP  Min: 134/75  Max: 190/97   Pulse  Min: 69  Max: 80   Resp  Min: 17  Max: 23   SpO2  Min: 91 %  Max: 98 %   No data recorded   Weight  Min: 139 kg (307 lb)  Max: 139 kg (307 lb)     Flowsheet Rows    Flowsheet Row First Filed Value   Admission Height 182.9 cm (72\") Documented at 12/14/2022 1010   Admission Weight 139 kg (307 lb) Documented at 12/14/2022 1010              Physical Exam:    General Appearance: Awake, alert, in no acute distress  Eyes: Pupils equal and reactive    Ears: Appear intact with no abnormalities noted  Nose: Nares normal, no drainage  Neck: supple, trachea midline, no carotid bruit and no JVD  Back: no kyphosis present,    Lungs: respirations regular, respirations even and respirations unlabored  Heart: normal S1, S2, no significant murmurs   No gallops or rubs  no rub and no click  Abdomen: normal bowel sounds, no tenderness   Skin: no bleeding, bruising or rash  Extremities: no cyanosis  Psychiatric/Behavioral: Negative for agitation, behavioral problems, confusion, the patient does  appear to be nervous/anxious.       Results Review:         I reviewed the patient's new clinical results.  I reviewed the patient's new imaging results and agree with the interpretation.  I reviewed the patient's other test results and agree with the interpretation  I personally viewed and interpreted the patient's EKG/Telemetry data    Discussed with patient  Updated patient regarding any new or relevant abnormalities on review of records or any new findings " on physical exam.   Mentioned to patient about purpose of visit and desirable health short and long term goals and objectives.     Reviewed available prior notes, consults, prior visits, laboratory findings, radiology and cardiology relevant reports.   Updated chart as applicable.   I have reviewed the patient's medical history in detail and updated the computerized patient record as relevant.          Assessment & Plan       Chest pain, unspecified type    Paroxysmal atrial fibrillation (HCC)    CAD (coronary artery disease)    Hx of CABG    Essential hypertension    PAD (peripheral artery disease) (HCC)    GERD (gastroesophageal reflux disease)      Plan    Patient is somewhat forgetful otherwise he is alert oriented x3  He does not want any additional cardiac testing  Requesting to be discharged  States his daughter is coming to pick him up  In view of this will not order any additional cardiac tests  Advised him to consider Lexiscan Cardiolite stress test  He wants to talk to his cardiologist in [place]  In view of above would defer to admitting team regarding discharge planning  Echocardiogram has been ordered yesterday but still not performed  Discontinue Plavix  Discontinue Lovenox  Start Eliquis 5 mg p.o. twice daily first dose 10 to 12 hours after last dose of Lovenox  Continue on telemetry while in hospital  See his cardiologist within 1 week of discharge  Fall precautions  Monitor for any overt or covert signs of bleeding      Bryan Palm MD  12/15/22  07:14 CST    EMR Dragon/Transcription was used to dictate part of this note

## 2022-12-15 NOTE — PLAN OF CARE
Goal Outcome Evaluation:  Plan of Care Reviewed With: patient        Progress: no change  Outcome Evaluation: Patient admitted with chest pain, awaiting room assignment. Noted agitation and frequent redirection in ER, can't seem to leave monitors or cords lying still, wants them wrapped around things, has dismantled several pulse ox probes. Seems very agitated by presence of cords. Amount of monitoring devices decreased as possible, improved when son at bedside. Somewhat unsteady when walking around room, assisted with urinal use. States he uses the bathroom easiest while sitting, offered bedside commode, pt declined. Voiding without difficulty with staff assist with urinal. No c/o chest pain at this time.

## 2024-02-05 ENCOUNTER — TELEPHONE (OUTPATIENT)
Dept: UROLOGY | Facility: CLINIC | Age: 79
End: 2024-02-05
Payer: MEDICARE

## 2024-02-13 NOTE — PROGRESS NOTES
"Subjective    Mr. Rosas is 78 y.o. male    Chief Complaint: \"I am here for someone to check my interstem device\"     History of Present Illness    78-year-old male new patient referred due to history of urinary frequency, urgency and incomplete bladder emptying for which previously underwent InterStim placement by Dr. Tavarez 2020.  Patient stating \"I am here for someone to check my InterStim device\". patient reporting ever since placement of the device has experienced back pain has been seen by neurology even and is wanting the device further evaluated as he states that the pain was not present until the InterStim was implanted.  Patient has not been in contact with an InterStim rep in many years.  Patient reporting that the device is still working as his urine frequency is much better than prior to the implant however is still awakening approximately 5 times nightly to urinate.  Currently followed by Dr. De Anda at [facility].  History of prostate CA diagnosed 2020 underwent radiation therapy completed 2021.  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  History of bladder neck contracture requiring dilation 8/2020.    Final Diagnosis path report 2/30/2020  A. Prostate, left lateral base, needle core biopsy - Benign prostatic stroma.   - Suboptimal biopsy specimen.     B. Prostate, left lateral mid, needle core biopsy - Benign prostatic glands and stroma.     C. Prostate, left lateral apex, needle core biopsy - Benign prostatic glands and stroma.     D. Prostate, left base, needle core biopsy - Atypical clear cell population of undetermined significance, see comment.     Comment: There is an atypical clear cell proliferation present which is negative for NKX3.1 and [place]-8. While these stains argue against a prostatic or renal origin, they cannot be entirely excluded. The tissue block has been exhausted. A targeted biopsy of this region is recommended.     E. Prostate, " left mid, needle core biopsy -  Benign prostatic glands and stroma.     F. Prostate, left apex, needle core biopsy -  Benign prostatic glands and stroma.     G. Prostate, right base, needle core biopsy -  Benign prostatic glands and stroma.     H. Prostate, right mid, needle core biopsy -  Benign prostatic glands and stroma.   - Suboptimal biopsy specimen.     I.  Prostate, right apex, needle core biopsy -  Benign prostatic glands and stroma.   - Suboptimal biopsy specimen.     J. 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     K. Prostate, right lateral mid, needle core biopsy -  Benign prostatic glands and stroma.     L. Prostate, right lateral apex, needle core biopsy -  Benign prostatic glands and stroma.   - Suboptimal biopsy specimen.     M. Prostate, area of interest #1, biopsy -  Adenocarcinoma, Burgin grade 3 + 4 = score of 7 (Grade group 2;Grade group 4 = 20%), in 1 of 1 core, involving 95% of total needle core tissue.   - Atypical Intraductal proliferation, see comment       The following portions of the patient's history were reviewed and updated as appropriate: allergies, current medications, past family history, past medical history, past social history, past surgical history and problem list.    Review of Systems   Constitutional:  Negative for chills and fever.   Gastrointestinal:  Negative for abdominal pain, anal bleeding, blood in stool, nausea and vomiting.   Genitourinary:  Positive for frequency and urgency. Negative for dysuria and hematuria.   Musculoskeletal:  Positive for back pain.   Neurological:  Positive for weakness.         Current Outpatient Medications:     apixaban (ELIQUIS) 5 MG tablet tablet, Take 1 tablet by mouth Every 12 (Twelve) Hours. Indications: Atrial Fibrillation, Disp: 60 tablet, Rfl: 0    atorvastatin (LIPITOR) 20 MG tablet, Take 1  tablet by mouth Daily., Disp: , Rfl:     Budeson-Glycopyrrol-Formoterol (Breztri Aerosphere) 160-9-4.8 MCG/ACT aerosol inhaler, Inhale 2 puffs 2 (Two) Times a Day., Disp: , Rfl:     Budeson-Glycopyrrol-Formoterol (BREZTRI) 160-9-4.8 MCG/ACT aerosol inhaler, Inhale., Disp: , Rfl:     carvedilol (COREG) 6.25 MG tablet, , Disp: , Rfl:     Cholecalciferol 25 MCG (1000 UT) tablet, Take 1 tablet by mouth Daily., Disp: , Rfl:     cyanocobalamin (VITAMIN B-12) 1000 MCG tablet, Take 1 tablet by mouth Daily., Disp: , Rfl:     Cymbalta 60 MG capsule, Take 1 capsule by mouth Daily., Disp: , Rfl:     famotidine (PEPCID) 40 MG tablet, , Disp: , Rfl:     isosorbide mononitrate (IMDUR) 120 MG 24 hr tablet, Take 1 tablet by mouth., Disp: , Rfl:     lisinopril (PRINIVIL,ZESTRIL) 20 MG tablet, Take 1 tablet by mouth Daily., Disp: , Rfl:     LORazepam (ATIVAN) 1 MG tablet, TAKE 1 TABLET(1 MG) BY MOUTH EVERY 8 HOURS AS NEEDED FOR ANXIETY, Disp: , Rfl:     Magnesium 400 MG capsule, Take 400 mg by mouth Daily., Disp: , Rfl:     metoprolol tartrate (LOPRESSOR) 25 MG tablet, Take 1 tablet by mouth., Disp: , Rfl:     multivitamin (THERAGRAN) tablet tablet, Take 1 tablet by mouth Daily., Disp: , Rfl:     nitroglycerin (NITROSTAT) 0.4 MG SL tablet, Place 1 tablet under the tongue Every 5 (Five) Minutes As Needed., Disp: , Rfl:     nystatin (MYCOSTATIN) 775174 UNIT/GM powder, Apply  topically to the appropriate area as directed 2 (Two) Times a Day., Disp: , Rfl:     Potassium 99 MG tablet, Take 1 tablet by mouth Daily., Disp: , Rfl:     POTASSIUM GLUCONATE PO, Take 1 tablet by mouth., Disp: , Rfl:     tamsulosin (FLOMAX) 0.4 MG capsule 24 hr capsule, Take 1 capsule by mouth Daily., Disp: , Rfl:     aspirin 81 MG EC tablet, Take 81 mg by mouth Daily. (Patient not taking: Reported on 2/21/2024), Disp: , Rfl:     ramipril (ALTACE) 10 MG capsule, Take 10 mg by mouth Daily. (Patient not taking: Reported on 2/21/2024), Disp: , Rfl:     ramipril  "(ALTACE) 5 MG capsule, Take 5 mg by mouth Daily. (Patient not taking: Reported on 2/21/2024), Disp: , Rfl:     History reviewed. No pertinent past medical history.    History reviewed. No pertinent surgical history.    Social History     Socioeconomic History    Marital status: Single       History reviewed. No pertinent family history.    Objective    Temp 97.5 °F (36.4 °C)   Ht 182.9 cm (72.01\")   Wt (!) 137 kg (301 lb 3.2 oz)   BMI 40.84 kg/m²     Physical Exam  Constitutional:       Appearance: Normal appearance.   Abdominal:      Tenderness: There is no right CVA tenderness or left CVA tenderness.   Skin:     General: Skin is warm and dry.   Neurological:      Mental Status: He is alert and oriented to person, place, and time.   Psychiatric:         Mood and Affect: Mood normal.         Behavior: Behavior normal.             Results for orders placed or performed in visit on 02/21/24   POC Urinalysis Dipstick, Multipro    Specimen: Urine   Result Value Ref Range    Color Yellow Yellow, Straw, Dark Yellow, Jada    Clarity, UA Clear Clear    Glucose, UA Negative Negative mg/dL    Bilirubin Negative Negative    Ketones, UA Negative Negative    Specific Gravity  1.020 1.005 - 1.030    Blood, UA Large (A) Negative    pH, Urine 5.5 5.0 - 8.0    Protein, POC 30 mg/dL (A) Negative mg/dL    Urobilinogen, UA 0.2 E.U./dL Normal, 0.2 E.U./dL    Nitrite, UA Negative Negative    Leukocytes Negative Negative   IPSS Questionnaire (AUA-7):  Incomplete emptying  Over the past month, how often have you had a sensation of not emptying your bladder completely after you finished urinating?: More than half the time (02/21/24 1009)  Frequency  Over the past month, how often have you had to urinate again less than two hours after you finishied urinating ?: Almost always (02/21/24 1009)  Intermittency  Over the past month, how often have you found you stopped and started again several time when you urinated ?: About half the time " (02/21/24 1009)  Urgency  Over the last month, how often have you found it difficult  have you found it difficult to postpone urination ?: Less than 1 time in 5 (02/21/24 1009)  Weak Stream  Over the past month, how often have you had a weak urinary stream ?: About half the time (02/21/24 1009)  Straining  Over the past month, how often have you had to push or strain to begin urination ?: More than half the time (02/21/24 1009)  Nocturia  Over the past month, how many times did you most typically get up to urinate from the time you went to bed until the time you got up in the morning ?: At least 5 times (02/21/24 1009)  Quality of life due to urinary symptoms  If you were to spend the rest of your life with your urinary condition the way it is now, how would feel about that?: Terrible (02/21/24 1009)    Scores  Total IPSS Score: (!) 25 (02/21/24 1009)  Total Score = Symptomatic Level: Severely symptomatic: 20-35 (02/21/24 1009)       Assessment and Plan    Diagnoses and all orders for this visit:    1. Retention of urine (Primary)  -     POC Urinalysis Dipstick, Multipro      Slightly confused during the encounter as patient states thought was only being evaluated today to have the InterStim device checked with the representative.  Based on the referral it was under the understanding that patient's device was not working properly and was possibly interested in replacement however patient states this is not the case as he was just told he needed to be seen here as they do not take care of InterStim's.  Have offered to bring patient back with the InterStim rep present to troubleshoot patient's device.  Patient states he will try to have this completed closer to home.  Have encouraged patient to contact the number on the back of his card as a representative should also be able to help patient by phone before presenting to office setting.    For now we will have patient follow-up with us as needed as I have told patient  and his son we would be more than happy to set up an appointment with the [facility] rep however they will try to get this closer to home.

## 2024-02-21 ENCOUNTER — OFFICE VISIT (OUTPATIENT)
Dept: UROLOGY | Facility: CLINIC | Age: 79
End: 2024-02-21
Payer: MEDICARE

## 2024-02-21 VITALS — WEIGHT: 301.2 LBS | HEIGHT: 72 IN | BODY MASS INDEX: 40.8 KG/M2 | TEMPERATURE: 97.5 F

## 2024-02-21 DIAGNOSIS — R33.9 RETENTION OF URINE: Primary | ICD-10-CM

## 2024-02-21 LAB
BILIRUB BLD-MCNC: NEGATIVE MG/DL
CLARITY, POC: CLEAR
COLOR UR: YELLOW
GLUCOSE UR STRIP-MCNC: NEGATIVE MG/DL
KETONES UR QL: NEGATIVE
LEUKOCYTE EST, POC: NEGATIVE
NITRITE UR-MCNC: NEGATIVE MG/ML
PH UR: 5.5 [PH] (ref 5–8)
PROT UR STRIP-MCNC: ABNORMAL MG/DL
RBC # UR STRIP: ABNORMAL /UL
SP GR UR: 1.02 (ref 1–1.03)
UROBILINOGEN UR QL: ABNORMAL

## 2024-02-21 RX ORDER — CARVEDILOL 6.25 MG/1
TABLET ORAL
COMMUNITY
Start: 2023-11-06

## 2024-02-21 RX ORDER — FAMOTIDINE 40 MG/1
TABLET, FILM COATED ORAL
COMMUNITY
Start: 2024-01-13

## 2024-02-21 RX ORDER — NYSTATIN 100000 [USP'U]/G
POWDER TOPICAL 2 TIMES DAILY
COMMUNITY
Start: 2024-02-01

## 2024-02-21 RX ORDER — ISOSORBIDE MONONITRATE 120 MG/1
120 TABLET, EXTENDED RELEASE ORAL
COMMUNITY

## 2024-02-21 RX ORDER — LISINOPRIL 20 MG/1
20 TABLET ORAL DAILY
COMMUNITY